# Patient Record
Sex: MALE | Race: OTHER | NOT HISPANIC OR LATINO
[De-identification: names, ages, dates, MRNs, and addresses within clinical notes are randomized per-mention and may not be internally consistent; named-entity substitution may affect disease eponyms.]

---

## 2020-07-10 VITALS
HEART RATE: 70 BPM | SYSTOLIC BLOOD PRESSURE: 147 MMHG | WEIGHT: 194.01 LBS | OXYGEN SATURATION: 95 % | RESPIRATION RATE: 17 BRPM | TEMPERATURE: 98 F | DIASTOLIC BLOOD PRESSURE: 94 MMHG | HEIGHT: 71 IN

## 2020-07-10 DIAGNOSIS — Z01.818 ENCOUNTER FOR OTHER PREPROCEDURAL EXAMINATION: ICD-10-CM

## 2020-07-10 PROBLEM — Z00.00 ENCOUNTER FOR PREVENTIVE HEALTH EXAMINATION: Status: ACTIVE | Noted: 2020-07-10

## 2020-07-10 NOTE — H&P ADULT - HISTORY OF PRESENT ILLNESS
COVID: [address] on 7/11/2020  Pharmacy: [facility] [phone]  Escort: Pt reports being independent and plans to take the train  Cardiologist: Dr. Murillo      Pt is a 65yo undomiciled M w/ and PMHx of HTN, HLD, systolic CHF (EF 35% via Echo on 2/22/2020) and ___CONGENITAL HEART ARRYTHMIA???_____ who presented to his cardiologist, Dr. Murillo for a cardiac clearance to have anesthesia for a possible biopsy ____????____, at which time he underwent a cardiac workup. Patient denies CP, SOB, POST, orthopnea, PND, dizziness, syncope, abdominal pain, diaphoresis, palpitations. Echo (2/22/2020) revealed mild concentric LVH, EF 35%, basal lateral LV wall motion hypokinesis, basal posterior LV wall motion hypokinesis, mid-lateral LV wall motion hypokinesis, normal RV size/fxn, moderately dilated LA, mild MR, mild TR. He was subsequently sent for an NST (6/8/2020) revealing moderate sized partially reversible inferior wall defect, LV motion w/ inferior wall severe hypokinesis, and EF 39%. Patient was subsequently sent for Cardiac Catheterization (7/6/2020) revealing mLAD 70% stenosis, RCA w/ mild diffuse disease, LMCA normal, LCx w/ mild diffuse disease.   In light of patient’s risk factors, abnormal Echo/NST, and known CAD via diagnostic cardiac cath, patient now presents to [facility] for planned PCI of known disease. COVID: [address] on 7/11/2020  Pharmacy: [facility] [phone]  Escort: Pt reports being independent and plans to take the train  Cardiologist: Dr. Murillo      Pt is a 65yo undomiciled M w/ and PMHx of HTN, HLD, systolic CHF (EF 35% via Echo on 2/22/2020) and an "arrhythmia since birth" (no collateral on this from PCP) who presented to his cardiologist, Dr. Murillo for a cardiac clearance to have anesthesia for a colonoscopy.Patient denies CP, SOB, POST, orthopnea, PND, dizziness, syncope, abdominal pain, diaphoresis, palpitations. Echo (2/22/2020) revealed mild concentric LVH, EF 35%, basal lateral LV wall motion hypokinesis, basal posterior LV wall motion hypokinesis, mid-lateral LV wall motion hypokinesis, normal RV size/fxn, moderately dilated LA, mild MR, mild TR. He was subsequently sent for an NST (6/8/2020) revealing moderate sized partially reversible inferior wall defect, LV motion w/ inferior wall severe hypokinesis, and EF 39%. Patient was subsequently sent for Cardiac Catheterization (7/6/2020) revealing mLAD 70% stenosis, RCA w/ mild diffuse disease, LMCA normal, LCx w/ mild diffuse disease.   In light of patient’s risk factors, abnormal Echo/NST, and known CAD via diagnostic cardiac cath, patient now presents to [facility] for planned PCI of known disease. COVID: [address] on 7/11/2020  Pharmacy: [facility] [phone]  Escort: Pt reports being independent and plans to take the train  Cardiologist: Dr. Murillo      Pt is a 65yo undomiciled (lives at shelter in [place]) Male w/ and PMHx of HTN, HLD, systolic CHF (EF 35% via Echo on 2/22/2020) and an "arrhythmia since birth" (no collateral on this from PCP) who presented to his cardiologist, Dr. Murillo for a cardiac clearance to have anesthesia for a colonoscopy.Patient denies CP, SOB, POST, orthopnea, PND, dizziness, syncope, abdominal pain, diaphoresis, palpitations. Echo (2/22/2020) revealed mild concentric LVH, EF 35%, basal lateral LV wall motion hypokinesis, basal posterior LV wall motion hypokinesis, mid-lateral LV wall motion hypokinesis, normal RV size/fxn, moderately dilated LA, mild MR, mild TR. He was subsequently sent for an NST (6/8/2020) revealing moderate sized partially reversible inferior wall defect, LV motion w/ inferior wall severe hypokinesis, and EF 39%. Patient was subsequently sent for Cardiac Catheterization (7/6/2020) revealing mLAD 70% stenosis, RCA w/ mild diffuse disease, LMCA normal, LCx w/ mild diffuse disease.   In light of patient’s risk factors, abnormal Echo/NST, and known CAD via diagnostic cardiac cath, patient now presents to [facility] for planned PCI of known disease. COVID: [address] on 7/11/2020  Pharmacy: [facility] [phone]  Escort: Pt reports being independent and plans to take the train  Cardiologist: Dr. Murillo      Pt is a 65yo undomiciled (lives at shelter in [place]) Male w/ and PMHx of HTN, HLD, Hx of asthma (no current issues; no history of intubation/hospitalization), systolic CHF (EF 35% via Echo on 2/22/2020) and an "arrhythmia since birth" (no collateral on this from PCP) who presented to his cardiologist, Dr. Murillo for a cardiac clearance to have anesthesia for a colonoscopy.Patient denies CP, SOB, POST, orthopnea, PND, dizziness, syncope, abdominal pain, diaphoresis, palpitations. Echo (2/22/2020) revealed mild concentric LVH, EF 35%, basal lateral LV wall motion hypokinesis, basal posterior LV wall motion hypokinesis, mid-lateral LV wall motion hypokinesis, normal RV size/fxn, moderately dilated LA, mild MR, mild TR. He was subsequently sent for an NST (6/8/2020) revealing moderate sized partially reversible inferior wall defect, LV motion w/ inferior wall severe hypokinesis, and EF 39%. Patient was subsequently sent for Cardiac Catheterization (7/6/2020) revealing mLAD 70% stenosis, RCA w/ mild diffuse disease, LMCA normal, LCx w/ mild diffuse disease.   In light of patient’s risk factors, abnormal Echo/NST, and known CAD via diagnostic cardiac cath, patient now presents to [facility] for planned PCI of known disease. COVID: [address] on 7/11/2020  Pharmacy: [facility] [phone]  Escort: Pt reports being independent and plans to take the train  Cardiologist: Dr. Murillo      Pt is a 65yo undomiciled (lives at shelter in [place]) Male current smoker w/ and PMHx of HTN, HLD, Hx of asthma (no current issues; no history of intubation/hospitalization), systolic CHF (EF 35% via Echo on 2/22/2020) and an "arrhythmia since birth" (no collateral on this from PCP) who presented to his cardiologist, Dr. Murillo for a cardiac clearance to have anesthesia for a colonoscopy.Patient denies CP, SOB, POST, orthopnea, PND, dizziness, syncope, abdominal pain, diaphoresis, palpitations. Echo (2/22/2020) revealed mild concentric LVH, EF 35%, basal lateral LV wall motion hypokinesis, basal posterior LV wall motion hypokinesis, mid-lateral LV wall motion hypokinesis, normal RV size/fxn, moderately dilated LA, mild MR, mild TR. He was subsequently sent for an NST (6/8/2020) revealing moderate sized partially reversible inferior wall defect, LV motion w/ inferior wall severe hypokinesis, and EF 39%. Patient was subsequently sent for Cardiac Catheterization (7/6/2020) revealing mLAD 70% stenosis, RCA w/ mild diffuse disease, LMCA normal, LCx w/ mild diffuse disease.   In light of patient’s risk factors, abnormal Echo/NST, and known CAD via diagnostic cardiac cath, patient now presents to [facility] for planned PCI of known disease.

## 2020-07-10 NOTE — H&P ADULT - ASSESSMENT
Pt is a 65yo undomiciled (lives at shelter in [place]) Male current smoker w/ and PMHx of HTN, HLD, Hx of asthma (no current issues; no history of intubation/hospitalization), systolic CHF (EF 35% via Echo on 2/22/2020) and an "arrhythmia since birth" (no collateral on this from PCP) who presents for staged PCI of known lesion     ASA III Mallampati III  Precath consented  Started IVF NS @ 75cc/h   Loaded with ???    Risks & benefits of procedure and alternative therapy have been explained to the patient including but not limited to: allergic reaction, bleeding w/possible need for blood transfusion, infection, renal and vascular compromise, limb damage, arrhythmia, stroke, vessel dissection/perforation, Myocardial infarction, emergent CABG. Informed consent obtained and in chart. Pt is a 65yo undomiciled (lives at shelter in [place]) Male current smoker w/ and PMHx of HTN, HLD, Hx of asthma (no current issues; no history of intubation/hospitalization), systolic CHF (EF 35% via Echo on 2/22/2020) and an "arrhythmia since birth" (no collateral on this from PCP) who presents for staged PCI of known lesion     ASA III Mallampati III  Precath consented  Started IVF NS @ 75cc/h   Loaded with Plavix 600mg POx1 and took daily ASA 81mg POx1 this AM     Risks & benefits of procedure and alternative therapy have been explained to the patient including but not limited to: allergic reaction, bleeding w/possible need for blood transfusion, infection, renal and vascular compromise, limb damage, arrhythmia, stroke, vessel dissection/perforation, Myocardial infarction, emergent CABG. Informed consent obtained and in chart. Pt is a 65yo undomiciled (lives at shelter in [place]) Male current smoker w/ and PMHx of HTN, HLD, Hx of asthma (no current issues; no history of intubation/hospitalization), systolic CHF (EF 35% via Echo on 2/22/2020) and an "arrhythmia since birth" (no collateral on this from PCP) who presents for staged PCI of known lesion     ASA III Mallampati III  Precath consented  Started IVF NS @ 75cc/h   Loaded with Plavix 600mg POx1 and took daily ASA 81mg POx1 this AM   K 3.4 repleted with KCL 40mEq POx1     Risks & benefits of procedure and alternative therapy have been explained to the patient including but not limited to: allergic reaction, bleeding w/possible need for blood transfusion, infection, renal and vascular compromise, limb damage, arrhythmia, stroke, vessel dissection/perforation, Myocardial infarction, emergent CABG. Informed consent obtained and in chart.

## 2020-07-10 NOTE — H&P ADULT - NSHPSOCIALHISTORY_GEN_ALL_CORE
[address] [place] [address]              Dear Samir Lara,    Our records indicate that based on your lung cancer screen performed on 12-8-16 at [facility] your yearly lung screen is due. National guidelines for preventive care encourage patients who smoke, or who have a history of smoking, to receive yearly lung screens if you:    ? are between the ages of 50-69  ? have smoked a pack a day or more for 30 years (or its equivalent such as  one and a half packs a day for 20 years) and   ? continue to smoke or have quit within the past 15 year    To schedule a lung screen you first need to have a discussion with your primary care physician and obtain an order. To schedule the screen call -[facility] ([phone]) at your earliest convenience. If your lung screen was done elsewhere please call to inform me; we value you as a patient and want to ensure that you are getting the appropriate care. If you have any questions or need assistance in scheduling, please dont hesitate to call.      Sincerely,  Omar Lockwood, MSN, BSN, RN, OCN  Lung Navigator  [phone]
Reports occasional EtOH consumption; Reports smoking 2 cigarettes a day; Denies recreational drug use.

## 2020-07-11 ENCOUNTER — APPOINTMENT (OUTPATIENT)
Dept: DISASTER EMERGENCY | Facility: CLINIC | Age: 65
End: 2020-07-11

## 2020-07-12 LAB — SARS-COV-2 N GENE NPH QL NAA+PROBE: NOT DETECTED

## 2020-07-14 ENCOUNTER — TRANSCRIPTION ENCOUNTER (OUTPATIENT)
Age: 65
End: 2020-07-14

## 2020-07-14 ENCOUNTER — INPATIENT (INPATIENT)
Facility: HOSPITAL | Age: 65
LOS: 0 days | Discharge: HOME | DRG: 247 | End: 2020-07-15
Attending: HOSPITALIST | Admitting: INTERNAL MEDICINE
Payer: MEDICARE

## 2020-07-14 LAB
A1C WITH ESTIMATED AVERAGE GLUCOSE RESULT: 5.8 % — HIGH (ref 4–5.6)
ALBUMIN SERPL ELPH-MCNC: 4.8 G/DL — SIGNIFICANT CHANGE UP (ref 3.3–5)
ALP SERPL-CCNC: 77 U/L — SIGNIFICANT CHANGE UP (ref 40–120)
ALT FLD-CCNC: 23 U/L — SIGNIFICANT CHANGE UP (ref 10–45)
ANION GAP SERPL CALC-SCNC: 14 MMOL/L — SIGNIFICANT CHANGE UP (ref 5–17)
APTT BLD: 30 SEC — SIGNIFICANT CHANGE UP (ref 27.5–35.5)
AST SERPL-CCNC: 20 U/L — SIGNIFICANT CHANGE UP (ref 10–40)
BASOPHILS # BLD AUTO: 0.03 K/UL — SIGNIFICANT CHANGE UP (ref 0–0.2)
BASOPHILS NFR BLD AUTO: 0.5 % — SIGNIFICANT CHANGE UP (ref 0–2)
BILIRUB SERPL-MCNC: 0.6 MG/DL — SIGNIFICANT CHANGE UP (ref 0.2–1.2)
BUN SERPL-MCNC: 20 MG/DL — SIGNIFICANT CHANGE UP (ref 7–23)
CALCIUM SERPL-MCNC: 9.4 MG/DL — SIGNIFICANT CHANGE UP (ref 8.4–10.5)
CHLORIDE SERPL-SCNC: 94 MMOL/L — LOW (ref 96–108)
CHOLEST SERPL-MCNC: 179 MG/DL — SIGNIFICANT CHANGE UP (ref 10–199)
CK MB CFR SERPL CALC: 1.3 NG/ML — SIGNIFICANT CHANGE UP (ref 0–6.7)
CK SERPL-CCNC: 135 U/L — SIGNIFICANT CHANGE UP (ref 30–200)
CO2 SERPL-SCNC: 30 MMOL/L — SIGNIFICANT CHANGE UP (ref 22–31)
CREAT SERPL-MCNC: 0.97 MG/DL — SIGNIFICANT CHANGE UP (ref 0.5–1.3)
EOSINOPHIL # BLD AUTO: 0.1 K/UL — SIGNIFICANT CHANGE UP (ref 0–0.5)
EOSINOPHIL NFR BLD AUTO: 1.5 % — SIGNIFICANT CHANGE UP (ref 0–6)
ESTIMATED AVERAGE GLUCOSE: 120 MG/DL — HIGH (ref 68–114)
GLUCOSE SERPL-MCNC: 104 MG/DL — HIGH (ref 70–99)
HCT VFR BLD CALC: 48.1 % — SIGNIFICANT CHANGE UP (ref 39–50)
HDLC SERPL-MCNC: 60 MG/DL — SIGNIFICANT CHANGE UP
HGB BLD-MCNC: 14.8 G/DL — SIGNIFICANT CHANGE UP (ref 13–17)
IMM GRANULOCYTES NFR BLD AUTO: 0.3 % — SIGNIFICANT CHANGE UP (ref 0–1.5)
INR BLD: 1.01 — SIGNIFICANT CHANGE UP (ref 0.88–1.16)
LIPID PNL WITH DIRECT LDL SERPL: 98 MG/DL — SIGNIFICANT CHANGE UP
LYMPHOCYTES # BLD AUTO: 2.34 K/UL — SIGNIFICANT CHANGE UP (ref 1–3.3)
LYMPHOCYTES # BLD AUTO: 35.2 % — SIGNIFICANT CHANGE UP (ref 13–44)
MCHC RBC-ENTMCNC: 22.8 PG — LOW (ref 27–34)
MCHC RBC-ENTMCNC: 30.8 GM/DL — LOW (ref 32–36)
MCV RBC AUTO: 74.1 FL — LOW (ref 80–100)
MONOCYTES # BLD AUTO: 0.5 K/UL — SIGNIFICANT CHANGE UP (ref 0–0.9)
MONOCYTES NFR BLD AUTO: 7.5 % — SIGNIFICANT CHANGE UP (ref 2–14)
NEUTROPHILS # BLD AUTO: 3.66 K/UL — SIGNIFICANT CHANGE UP (ref 1.8–7.4)
NEUTROPHILS NFR BLD AUTO: 55 % — SIGNIFICANT CHANGE UP (ref 43–77)
NRBC # BLD: 0 /100 WBCS — SIGNIFICANT CHANGE UP (ref 0–0)
PLATELET # BLD AUTO: 256 K/UL — SIGNIFICANT CHANGE UP (ref 150–400)
POTASSIUM SERPL-MCNC: 3.4 MMOL/L — LOW (ref 3.5–5.3)
POTASSIUM SERPL-SCNC: 3.4 MMOL/L — LOW (ref 3.5–5.3)
PROT SERPL-MCNC: 8 G/DL — SIGNIFICANT CHANGE UP (ref 6–8.3)
PROTHROM AB SERPL-ACNC: 12.1 SEC — SIGNIFICANT CHANGE UP (ref 10.6–13.6)
RBC # BLD: 6.49 M/UL — HIGH (ref 4.2–5.8)
RBC # FLD: 18 % — HIGH (ref 10.3–14.5)
SODIUM SERPL-SCNC: 138 MMOL/L — SIGNIFICANT CHANGE UP (ref 135–145)
TOTAL CHOLESTEROL/HDL RATIO MEASUREMENT: 3 RATIO — LOW (ref 3.4–9.6)
TRIGL SERPL-MCNC: 104 MG/DL — SIGNIFICANT CHANGE UP (ref 10–149)
TROPONIN T SERPL-MCNC: <0.01 NG/ML — SIGNIFICANT CHANGE UP (ref 0–0.01)
WBC # BLD: 6.65 K/UL — SIGNIFICANT CHANGE UP (ref 3.8–10.5)
WBC # FLD AUTO: 6.65 K/UL — SIGNIFICANT CHANGE UP (ref 3.8–10.5)

## 2020-07-14 PROCEDURE — 99223 1ST HOSP IP/OBS HIGH 75: CPT

## 2020-07-14 PROCEDURE — 93010 ELECTROCARDIOGRAM REPORT: CPT

## 2020-07-14 RX ORDER — CLOPIDOGREL BISULFATE 75 MG/1
600 TABLET, FILM COATED ORAL ONCE
Refills: 0 | Status: COMPLETED | OUTPATIENT
Start: 2020-07-14 | End: 2020-07-14

## 2020-07-14 RX ORDER — CLOPIDOGREL BISULFATE 75 MG/1
75 TABLET, FILM COATED ORAL DAILY
Refills: 0 | Status: DISCONTINUED | OUTPATIENT
Start: 2020-07-15 | End: 2020-07-15

## 2020-07-14 RX ORDER — SACUBITRIL AND VALSARTAN 24; 26 MG/1; MG/1
1 TABLET, FILM COATED ORAL
Refills: 0 | Status: DISCONTINUED | OUTPATIENT
Start: 2020-07-15 | End: 2020-07-15

## 2020-07-14 RX ORDER — ASPIRIN/CALCIUM CARB/MAGNESIUM 324 MG
81 TABLET ORAL DAILY
Refills: 0 | Status: DISCONTINUED | OUTPATIENT
Start: 2020-07-15 | End: 2020-07-15

## 2020-07-14 RX ORDER — CHLORTHALIDONE 50 MG
25 TABLET ORAL EVERY 24 HOURS
Refills: 0 | Status: DISCONTINUED | OUTPATIENT
Start: 2020-07-15 | End: 2020-07-15

## 2020-07-14 RX ORDER — SODIUM CHLORIDE 9 MG/ML
500 INJECTION INTRAMUSCULAR; INTRAVENOUS; SUBCUTANEOUS
Refills: 0 | Status: DISCONTINUED | OUTPATIENT
Start: 2020-07-14 | End: 2020-07-15

## 2020-07-14 RX ORDER — ATORVASTATIN CALCIUM 80 MG/1
80 TABLET, FILM COATED ORAL AT BEDTIME
Refills: 0 | Status: DISCONTINUED | OUTPATIENT
Start: 2020-07-14 | End: 2020-07-15

## 2020-07-14 RX ORDER — SODIUM CHLORIDE 9 MG/ML
500 INJECTION INTRAMUSCULAR; INTRAVENOUS; SUBCUTANEOUS
Refills: 0 | Status: DISCONTINUED | OUTPATIENT
Start: 2020-07-14 | End: 2020-07-14

## 2020-07-14 RX ORDER — POTASSIUM CHLORIDE 20 MEQ
40 PACKET (EA) ORAL ONCE
Refills: 0 | Status: COMPLETED | OUTPATIENT
Start: 2020-07-14 | End: 2020-07-14

## 2020-07-14 RX ORDER — CHLORHEXIDINE GLUCONATE 213 G/1000ML
1 SOLUTION TOPICAL ONCE
Refills: 0 | Status: DISCONTINUED | OUTPATIENT
Start: 2020-07-14 | End: 2020-07-14

## 2020-07-14 RX ORDER — AMLODIPINE BESYLATE 2.5 MG/1
2.5 TABLET ORAL DAILY
Refills: 0 | Status: DISCONTINUED | OUTPATIENT
Start: 2020-07-14 | End: 2020-07-15

## 2020-07-14 RX ORDER — ISOSORBIDE MONONITRATE 60 MG/1
30 TABLET, EXTENDED RELEASE ORAL DAILY
Refills: 0 | Status: DISCONTINUED | OUTPATIENT
Start: 2020-07-15 | End: 2020-07-15

## 2020-07-14 RX ORDER — RANOLAZINE 500 MG/1
1 TABLET, FILM COATED, EXTENDED RELEASE ORAL
Qty: 0 | Refills: 0 | DISCHARGE

## 2020-07-14 RX ORDER — CHLORTHALIDONE 50 MG
1 TABLET ORAL
Qty: 0 | Refills: 0 | DISCHARGE

## 2020-07-14 RX ADMIN — CLOPIDOGREL BISULFATE 600 MILLIGRAM(S): 75 TABLET, FILM COATED ORAL at 17:00

## 2020-07-14 RX ADMIN — Medication 40 MILLIEQUIVALENT(S): at 17:00

## 2020-07-14 RX ADMIN — SODIUM CHLORIDE 40 MILLILITER(S): 9 INJECTION INTRAMUSCULAR; INTRAVENOUS; SUBCUTANEOUS at 17:00

## 2020-07-14 RX ADMIN — ISOSORBIDE MONONITRATE 30 MILLIGRAM(S): 60 TABLET, EXTENDED RELEASE ORAL at 22:19

## 2020-07-14 RX ADMIN — ATORVASTATIN CALCIUM 80 MILLIGRAM(S): 80 TABLET, FILM COATED ORAL at 22:19

## 2020-07-14 RX ADMIN — SODIUM CHLORIDE 50 MILLILITER(S): 9 INJECTION INTRAMUSCULAR; INTRAVENOUS; SUBCUTANEOUS at 22:20

## 2020-07-14 RX ADMIN — SODIUM CHLORIDE 40 MILLILITER(S): 9 INJECTION INTRAMUSCULAR; INTRAVENOUS; SUBCUTANEOUS at 16:41

## 2020-07-14 NOTE — PROVIDER CONTACT NOTE (CHANGE IN STATUS NOTIFICATION) - ASSESSMENT
Hematoma developed. Manual pressure applied. Pleth appropriate. Sat 96 % to right hand. Palpable positive right radial pulse. Capillary refill less than three seconds to fingers of right hand. Patient denies tenderness to right arm. Area hardened and warm to touch. Patient seen by Dr. Euceda and Dr. Wilson at 2115 in cardiac cath holding area. Ultrasound performed that confirmed good flow after additional band applied.

## 2020-07-14 NOTE — PATIENT PROFILE ADULT - PACKS PER DAY
Tissue Cultured Epidermal Autograft Text: The defect edges were debeveled with a #15 scalpel blade.  Given the location of the defect, shape of the defect and the proximity to free margins a tissue cultured epidermal autograft was deemed most appropriate.  The graft was then trimmed to fit the size of the defect.  The graft was then placed in the primary defect and oriented appropriately. 1

## 2020-07-14 NOTE — PROVIDER CONTACT NOTE (CHANGE IN STATUS NOTIFICATION) - ACTION/TREATMENT ORDERED:
Additional TR band placed to right wrist. Patient currently has TR Band x 2 in place. Keep TR Band on for an additional hour.

## 2020-07-15 ENCOUNTER — TRANSCRIPTION ENCOUNTER (OUTPATIENT)
Age: 65
End: 2020-07-15

## 2020-07-15 VITALS — HEART RATE: 78 BPM | DIASTOLIC BLOOD PRESSURE: 89 MMHG | SYSTOLIC BLOOD PRESSURE: 137 MMHG

## 2020-07-15 LAB
ANION GAP SERPL CALC-SCNC: 13 MMOL/L — SIGNIFICANT CHANGE UP (ref 5–17)
BUN SERPL-MCNC: 21 MG/DL — SIGNIFICANT CHANGE UP (ref 7–23)
CALCIUM SERPL-MCNC: 9.1 MG/DL — SIGNIFICANT CHANGE UP (ref 8.4–10.5)
CHLORIDE SERPL-SCNC: 98 MMOL/L — SIGNIFICANT CHANGE UP (ref 96–108)
CO2 SERPL-SCNC: 25 MMOL/L — SIGNIFICANT CHANGE UP (ref 22–31)
CREAT SERPL-MCNC: 0.82 MG/DL — SIGNIFICANT CHANGE UP (ref 0.5–1.3)
GLUCOSE SERPL-MCNC: 106 MG/DL — HIGH (ref 70–99)
HCT VFR BLD CALC: 42.9 % — SIGNIFICANT CHANGE UP (ref 39–50)
HGB BLD-MCNC: 13.6 G/DL — SIGNIFICANT CHANGE UP (ref 13–17)
MAGNESIUM SERPL-MCNC: 2 MG/DL — SIGNIFICANT CHANGE UP (ref 1.6–2.6)
MCHC RBC-ENTMCNC: 22.8 PG — LOW (ref 27–34)
MCHC RBC-ENTMCNC: 31.7 GM/DL — LOW (ref 32–36)
MCV RBC AUTO: 72 FL — LOW (ref 80–100)
NRBC # BLD: 0 /100 WBCS — SIGNIFICANT CHANGE UP (ref 0–0)
PLATELET # BLD AUTO: 237 K/UL — SIGNIFICANT CHANGE UP (ref 150–400)
POTASSIUM SERPL-MCNC: 3.4 MMOL/L — LOW (ref 3.5–5.3)
POTASSIUM SERPL-SCNC: 3.4 MMOL/L — LOW (ref 3.5–5.3)
RBC # BLD: 5.96 M/UL — HIGH (ref 4.2–5.8)
RBC # FLD: 16.7 % — HIGH (ref 10.3–14.5)
SODIUM SERPL-SCNC: 136 MMOL/L — SIGNIFICANT CHANGE UP (ref 135–145)
WBC # BLD: 6.09 K/UL — SIGNIFICANT CHANGE UP (ref 3.8–10.5)
WBC # FLD AUTO: 6.09 K/UL — SIGNIFICANT CHANGE UP (ref 3.8–10.5)

## 2020-07-15 PROCEDURE — 99239 HOSP IP/OBS DSCHRG MGMT >30: CPT

## 2020-07-15 RX ORDER — SACUBITRIL AND VALSARTAN 24; 26 MG/1; MG/1
1 TABLET, FILM COATED ORAL
Qty: 60 | Refills: 3
Start: 2020-07-15 | End: 2020-11-11

## 2020-07-15 RX ORDER — METOPROLOL TARTRATE 50 MG
25 TABLET ORAL DAILY
Refills: 0 | Status: DISCONTINUED | OUTPATIENT
Start: 2020-07-15 | End: 2020-07-15

## 2020-07-15 RX ORDER — CLOPIDOGREL BISULFATE 75 MG/1
1 TABLET, FILM COATED ORAL
Qty: 30 | Refills: 11
Start: 2020-07-15 | End: 2021-07-09

## 2020-07-15 RX ORDER — ISOSORBIDE MONONITRATE 60 MG/1
1 TABLET, EXTENDED RELEASE ORAL
Qty: 0 | Refills: 0 | DISCHARGE

## 2020-07-15 RX ORDER — ISOSORBIDE MONONITRATE 60 MG/1
1 TABLET, EXTENDED RELEASE ORAL
Qty: 30 | Refills: 3
Start: 2020-07-15 | End: 2020-11-11

## 2020-07-15 RX ORDER — METOPROLOL TARTRATE 50 MG
1 TABLET ORAL
Qty: 30 | Refills: 3
Start: 2020-07-15 | End: 2020-11-11

## 2020-07-15 RX ORDER — AMLODIPINE BESYLATE 2.5 MG/1
1 TABLET ORAL
Qty: 0 | Refills: 0 | DISCHARGE

## 2020-07-15 RX ORDER — AMLODIPINE BESYLATE 2.5 MG/1
1 TABLET ORAL
Qty: 30 | Refills: 3
Start: 2020-07-15 | End: 2020-11-11

## 2020-07-15 RX ORDER — POTASSIUM CHLORIDE 20 MEQ
40 PACKET (EA) ORAL ONCE
Refills: 0 | Status: COMPLETED | OUTPATIENT
Start: 2020-07-15 | End: 2020-07-15

## 2020-07-15 RX ORDER — ATORVASTATIN CALCIUM 80 MG/1
1 TABLET, FILM COATED ORAL
Qty: 30 | Refills: 3
Start: 2020-07-15 | End: 2020-11-11

## 2020-07-15 RX ORDER — SACUBITRIL AND VALSARTAN 24; 26 MG/1; MG/1
1 TABLET, FILM COATED ORAL
Qty: 0 | Refills: 0 | DISCHARGE

## 2020-07-15 RX ORDER — ATORVASTATIN CALCIUM 80 MG/1
1 TABLET, FILM COATED ORAL
Qty: 0 | Refills: 0 | DISCHARGE

## 2020-07-15 RX ORDER — ASPIRIN/CALCIUM CARB/MAGNESIUM 324 MG
1 TABLET ORAL
Qty: 30 | Refills: 11
Start: 2020-07-15 | End: 2021-07-09

## 2020-07-15 RX ORDER — ASPIRIN/CALCIUM CARB/MAGNESIUM 324 MG
1 TABLET ORAL
Qty: 0 | Refills: 0 | DISCHARGE

## 2020-07-15 RX ADMIN — AMLODIPINE BESYLATE 2.5 MILLIGRAM(S): 2.5 TABLET ORAL at 05:02

## 2020-07-15 RX ADMIN — CLOPIDOGREL BISULFATE 75 MILLIGRAM(S): 75 TABLET, FILM COATED ORAL at 10:32

## 2020-07-15 RX ADMIN — Medication 40 MILLIEQUIVALENT(S): at 07:50

## 2020-07-15 RX ADMIN — Medication 81 MILLIGRAM(S): at 10:32

## 2020-07-15 RX ADMIN — Medication 25 MILLIGRAM(S): at 10:32

## 2020-07-15 NOTE — DISCHARGE NOTE PROVIDER - CARE PROVIDER_API CALL
Mo Murillo)  Cardiology; Internal Medicine  [address] [place] [address]  Phone: [phone]  Fax: [phone]  Follow Up Time: 1 week Mo Murillo  [address] [place] [address]  Phone: [phone]  Fax: (   )    -  Follow Up Time: 1 week

## 2020-07-15 NOTE — DISCHARGE NOTE PROVIDER - HOSPITAL COURSE
63yo undomiciled (lives at shelter in [place]) Male current smoker w/ and PMHx of HTN, HLD, Hx of asthma (no current issues; no history of intubation/hospitalization), systolic CHF (EF 35% via Echo on 2/22/2020) and an "arrhythmia since birth" (no collateral on this from PCP) who presented to his cardiologist, Dr. Murillo for a cardiac clearance to have anesthesia for a colonoscopy.Patient denies CP, SOB, POST, orthopnea, PND, dizziness, syncope, abdominal pain, diaphoresis, palpitations. Echo (2/22/2020) revealed mild concentric LVH, EF 35%, basal lateral LV wall motion hypokinesis, basal posterior LV wall motion hypokinesis, mid-lateral LV wall motion hypokinesis, normal RV size/fxn, moderately dilated LA, mild MR, mild TR. He was subsequently sent for an NST (6/8/2020) revealing moderate sized partially reversible inferior wall defect, LV motion w/ inferior wall severe hypokinesis, and EF 39%. Patient was subsequently sent for Cardiac Catheterization (7/6/2020) revealing mLAD 70% stenosis, RCA w/ mild diffuse disease, LMCA normal, LCx w/ mild diffuse disease. In light of patient’s risk factors, abnormal Echo/NST, and known CAD via diagnostic cardiac cath, patient now presents to [facility] for planned PCI of known disease.     Pt now s/p cardiac catheterization 7/14/20 receiving NAHID mLAD (70%), access R radial. Pt seen and examined at bedside this AM without any complaints or events overnight, access site stable non TTP, without ecchymosis, bleeding or hematoma, pulse 2+. VSS, labs and telemetry reviewed and pt stable for discharge as discussed with Dr. Aguila. Pt has received appropriate discharge instructions, including medication regimen, access site management and follow up with Dr. Murillo in 1-2 weeks.  Discharge medication regimen has been reviewed with attending with plan for patient to take Aspirin 81 mg daily, Plavix 75 mg daily, and ______. 65yo undomiciled (lives at shelter in [place]) Male current smoker w/ and PMHx of HTN, HLD, Hx of asthma (no current issues; no history of intubation/hospitalization), systolic CHF (EF 35% via Echo on 2/22/2020) and an "arrhythmia since birth" (no collateral on this from PCP) who presented to his cardiologist, Dr. Murillo for a cardiac clearance to have anesthesia for a colonoscopy.Patient denies CP, SOB, POST, orthopnea, PND, dizziness, syncope, abdominal pain, diaphoresis, palpitations. Echo (2/22/2020) revealed mild concentric LVH, EF 35%, basal lateral LV wall motion hypokinesis, basal posterior LV wall motion hypokinesis, mid-lateral LV wall motion hypokinesis, normal RV size/fxn, moderately dilated LA, mild MR, mild TR. He was subsequently sent for an NST (6/8/2020) revealing moderate sized partially reversible inferior wall defect, LV motion w/ inferior wall severe hypokinesis, and EF 39%. Patient was subsequently sent for Cardiac Catheterization (7/6/2020) revealing mLAD 70% stenosis, RCA w/ mild diffuse disease, LMCA normal, LCx w/ mild diffuse disease. In light of patient’s risk factors, abnormal Echo/NST, and known CAD via diagnostic cardiac cath, patient now presents to [facility] for planned PCI of known disease.     Pt now s/p cardiac catheterization 7/14/20 receiving NAHID mLAD (70%), access R radial. Pt seen and examined at bedside this AM without any complaints or events overnight, access site stable non TTP, without ecchymosis, bleeding or hematoma, pulse 2+. VSS, labs and telemetry reviewed and pt stable for discharge as discussed with Dr. Aguila. Pt has received appropriate discharge instructions, including medication regimen, access site management and follow up with Dr. Murillo in 1-2 weeks.  Discharge medication regimen has been reviewed with attending with plan for patient to take Aspirin 81 mg daily, Plavix 75 mg daily, atorvastatin 80mg QD, and Toprol 25mg QD. 63yo undomiciled (lives at shelter in [place]) Male current smoker w/ and PMHx of HTN, HLD, Hx of asthma (no current issues; no history of intubation/hospitalization), systolic CHF (EF 35% via Echo on 2/22/2020) and an "arrhythmia since birth" (no collateral on this from PCP) who presented to his cardiologist, Dr. Murillo for a cardiac clearance to have anesthesia for a colonoscopy.Patient denies CP, SOB, POST, orthopnea, PND, dizziness, syncope, abdominal pain, diaphoresis, palpitations. Echo (2/22/2020) revealed mild concentric LVH, EF 35%, basal lateral LV wall motion hypokinesis, basal posterior LV wall motion hypokinesis, mid-lateral LV wall motion hypokinesis, normal RV size/fxn, moderately dilated LA, mild MR, mild TR. He was subsequently sent for an NST (6/8/2020) revealing moderate sized partially reversible inferior wall defect, LV motion w/ inferior wall severe hypokinesis, and EF 39%. Patient was subsequently sent for Cardiac Catheterization (7/6/2020) revealing mLAD 70% stenosis, RCA w/ mild diffuse disease, LMCA normal, LCx w/ mild diffuse disease. In light of patient’s risk factors, abnormal Echo/NST, and known CAD via diagnostic cardiac cath, patient now presents to [facility] for planned PCI of known disease.     Pt now s/p cardiac catheterization 7/14/20 receiving NAHID mLAD (70%), access R radial. Pt seen and examined at bedside this AM without any complaints or events overnight, access site stable non TTP, patient noted to have proximal swelling and hyperpigmentation of skin, IC fellow and Dr. Angulo evaluated site and stated site stable and okay for discharge, pulses 2+. VSS, labs and telemetry reviewed and pt stable for discharge as discussed with Dr. Aguila. Pt has received appropriate discharge instructions, including medication regimen, access site management and follow up with Dr. Murillo in 1-2 weeks.  Discharge medication regimen has been reviewed with attending with plan for patient to take Aspirin 81 mg daily, Plavix 75 mg daily, atorvastatin 80mg QD, and Toprol 25mg QD.

## 2020-07-15 NOTE — DISCHARGE NOTE NURSING/CASE MANAGEMENT/SOCIAL WORK - PATIENT PORTAL LINK FT
You can access the FollowMyHealth Patient Portal offered by [facility] by registering at the following website: [email or web address]. By joining [facility]’s FollowMyHealth portal, you will also be able to view your health information using other applications (apps) compatible with our system.

## 2020-07-15 NOTE — DISCHARGE NOTE PROVIDER - PROVIDER TOKENS
PROVIDER:[TOKEN:[[id number]],FOLLOWUP:[1 week]] FREE:[LAST:[Asti],FIRST:[Mo],PHONE:[phone]],FAX:[(   [phone]    -],ADDRESS:[[address] [place] [address]],FOLLOWUP:[1 week]]

## 2020-07-15 NOTE — PROVIDER CONTACT NOTE (OTHER) - ASSESSMENT
Pt's HR 90, /78, sp02 98% on room air. Pt asymptomatic, denies palpitations/chest pain/shortness of breath. Pt states he's had an "irregular heart rate all my life"

## 2020-07-15 NOTE — DISCHARGE NOTE PROVIDER - NSDCMRMEDTOKEN_GEN_ALL_CORE_FT
amLODIPine 2.5 mg oral tablet: 1 tab(s) orally once a day  Aspirin Enteric Coated 81 mg oral delayed release tablet: 1 tab(s) orally once a day  atorvastatin 40 mg oral tablet: 1 tab(s) orally once a day  chlorthalidone 25 mg oral tablet: 1 tab(s) orally once a day  Entresto 24 mg-26 mg oral tablet: 1 tab(s) orally 2 times a day  isosorbide mononitrate 30 mg oral tablet, extended release: 1 tab(s) orally once a day (in the morning) amLODIPine 2.5 mg oral tablet: 1 tab(s) orally once a day  Aspirin Enteric Coated 81 mg oral delayed release tablet: 1 tab(s) orally once a day  atorvastatin 80 mg oral tablet: 1 tab(s) orally once a day (at bedtime)  chlorthalidone 25 mg oral tablet: 1 tab(s) orally once a day  clopidogrel 75 mg oral tablet: 1 tab(s) orally once a day  Entresto 24 mg-26 mg oral tablet: 1 tab(s) orally 2 times a day  isosorbide mononitrate 30 mg oral tablet, extended release: 1 tab(s) orally once a day (in the morning)  Toprol-XL 25 mg oral tablet, extended release: 1 tab(s) orally once a day

## 2020-07-15 NOTE — DISCHARGE NOTE PROVIDER - NSDCCPCAREPLAN_GEN_ALL_CORE_FT
PRINCIPAL DISCHARGE DIAGNOSIS  Diagnosis: CAD (coronary artery disease)  Assessment and Plan of Treatment: You were found to have blockages in the arteries of your heart, also known as Coronary Artery Diseease. You underwent a cardiac angiogram on 7/14/20 and received a stent to the left anterior descending artery. PLEASE CONTINUE ASPIRIN 81MG DAILY AND PLAVIX 75MG DAILY. DO NOT STOP THESE MEDICATIONS FOR ANY REASON AS THEY ARE KEEPING YOUR STENT OPEN AND PREVENTING A HEART ATTACK.   Avoid strenuous activity or heavy lifting anything more than 5lbs for the next five days. Do not take a bath or swim for the next five days; you may shower. For any bleeding or hematoma formation (hardened blood collection under the skin) at the access site of your right wrist please hold pressure and go to the emergency room. Please follow up with Dr. Murillo in 1-2 weeks. For recurrent chest pain, please call your doctor or go to the emergency room.      SECONDARY DISCHARGE DIAGNOSES  Diagnosis: HTN (hypertension)  Assessment and Plan of Treatment: Please continue your medications as listed to keep your blood pressure controlled. For blood pressure that is too high or too low please see your doctor or go to the emergency room as necessary.    Diagnosis: Congestive heart failure (CHF)  Assessment and Plan of Treatment: You have a weak heart, also known as Congestive Heart Failure (CHF). Your Ejection Fraction (EF) is ____, a normal EF is 55-60%. Please continue _____ exactly as listed. Avoid drinking more than 1.5L of fluid daily. Maintain a low salt diet and weigh yourself daily. For any significant increases in daily weight with associated swelling in the legs or abdomen with shortness of breath, please call your doctor or go to the emergency room. Follow up with Dr. Murillo in 1 week.    Diagnosis: HLD (hyperlipidemia)  Assessment and Plan of Treatment: Please continue ____ at bedtime to keep your cholesterol low. High cholesterol contributes to heart disease. PRINCIPAL DISCHARGE DIAGNOSIS  Diagnosis: CAD (coronary artery disease)  Assessment and Plan of Treatment: You were found to have blockages in the arteries of your heart, also known as Coronary Artery Diseease. You underwent a cardiac angiogram on 7/14/20 and received a stent to the left anterior descending artery. PLEASE CONTINUE ASPIRIN 81MG DAILY AND PLAVIX 75MG DAILY. DO NOT STOP THESE MEDICATIONS FOR ANY REASON AS THEY ARE KEEPING YOUR STENT OPEN AND PREVENTING A HEART ATTACK.   Avoid strenuous activity or heavy lifting anything more than 5lbs for the next five days. Do not take a bath or swim for the next five days; you may shower. For any bleeding or hematoma formation (hardened blood collection under the skin) at the access site of your right wrist please hold pressure and go to the emergency room. Please follow up with Dr. Murillo in 1-2 weeks. For recurrent chest pain, please call your doctor or go   to the emergency room.  -You were started on metoprolol succinate 25mg orally daily      SECONDARY DISCHARGE DIAGNOSES  Diagnosis: HTN (hypertension)  Assessment and Plan of Treatment: Please continue your medications as listed to keep your blood pressure controlled. For blood pressure that is too high or too low please see your doctor or go to the emergency room as necessary.    Diagnosis: Congestive heart failure (CHF)  Assessment and Plan of Treatment: You have a weak heart, also known as Congestive Heart Failure (CHF). Your Ejection Fraction (EF) is 35 % a normal EF is 55-60%. Please continue Entresto exactly as listed. Avoid drinking more than 1.5L of fluid daily. Maintain a low salt diet and weigh yourself daily. For any significant increases in daily weight with associated swelling in the legs or abdomen with shortness of breath, please call your doctor or go to the emergency room. Follow up with Dr. Murillo in 1 week.    Diagnosis: HLD (hyperlipidemia)  Assessment and Plan of Treatment: Please continue atorvastatin 80mg at bedtime to keep your cholesterol low. High cholesterol contributes to heart disease.

## 2020-07-16 PROCEDURE — 82550 ASSAY OF CK (CPK): CPT

## 2020-07-16 PROCEDURE — C1874: CPT

## 2020-07-16 PROCEDURE — 80053 COMPREHEN METABOLIC PANEL: CPT

## 2020-07-16 PROCEDURE — C1769: CPT

## 2020-07-16 PROCEDURE — 36415 COLL VENOUS BLD VENIPUNCTURE: CPT

## 2020-07-16 PROCEDURE — 85027 COMPLETE CBC AUTOMATED: CPT

## 2020-07-16 PROCEDURE — C1725: CPT

## 2020-07-16 PROCEDURE — 84484 ASSAY OF TROPONIN QUANT: CPT

## 2020-07-16 PROCEDURE — 83735 ASSAY OF MAGNESIUM: CPT

## 2020-07-16 PROCEDURE — 85730 THROMBOPLASTIN TIME PARTIAL: CPT

## 2020-07-16 PROCEDURE — 80061 LIPID PANEL: CPT

## 2020-07-16 PROCEDURE — 85610 PROTHROMBIN TIME: CPT

## 2020-07-16 PROCEDURE — 82553 CREATINE MB FRACTION: CPT

## 2020-07-16 PROCEDURE — 80048 BASIC METABOLIC PNL TOTAL CA: CPT

## 2020-07-16 PROCEDURE — 85025 COMPLETE CBC W/AUTO DIFF WBC: CPT

## 2020-07-16 PROCEDURE — 93005 ELECTROCARDIOGRAM TRACING: CPT

## 2020-07-16 PROCEDURE — C1894: CPT

## 2020-07-16 PROCEDURE — C1887: CPT

## 2020-07-16 PROCEDURE — 83036 HEMOGLOBIN GLYCOSYLATED A1C: CPT

## 2020-07-22 DIAGNOSIS — Z79.82 LONG TERM (CURRENT) USE OF ASPIRIN: ICD-10-CM

## 2020-07-22 DIAGNOSIS — E78.5 HYPERLIPIDEMIA, UNSPECIFIED: ICD-10-CM

## 2020-07-22 DIAGNOSIS — Z59.0 HOMELESSNESS: ICD-10-CM

## 2020-07-22 DIAGNOSIS — J45.909 UNSPECIFIED ASTHMA, UNCOMPLICATED: ICD-10-CM

## 2020-07-22 DIAGNOSIS — F17.210 NICOTINE DEPENDENCE, CIGARETTES, UNCOMPLICATED: ICD-10-CM

## 2020-07-22 DIAGNOSIS — I50.20 UNSPECIFIED SYSTOLIC (CONGESTIVE) HEART FAILURE: ICD-10-CM

## 2020-07-22 DIAGNOSIS — I25.10 ATHEROSCLEROTIC HEART DISEASE OF NATIVE CORONARY ARTERY WITHOUT ANGINA PECTORIS: ICD-10-CM

## 2020-07-22 DIAGNOSIS — I25.119 ATHEROSCLEROTIC HEART DISEASE OF NATIVE CORONARY ARTERY WITH UNSPECIFIED ANGINA PECTORIS: ICD-10-CM

## 2020-07-22 DIAGNOSIS — I11.0 HYPERTENSIVE HEART DISEASE WITH HEART FAILURE: ICD-10-CM

## 2020-07-22 SDOH — ECONOMIC STABILITY - HOUSING INSECURITY: HOMELESSNESS: Z59.0

## 2020-11-17 PROBLEM — E78.5 HYPERLIPIDEMIA, UNSPECIFIED: Chronic | Status: ACTIVE | Noted: 2020-07-10

## 2020-11-17 PROBLEM — J45.909 UNSPECIFIED ASTHMA, UNCOMPLICATED: Chronic | Status: ACTIVE | Noted: 2020-07-10

## 2020-11-17 PROBLEM — I50.20 UNSPECIFIED SYSTOLIC (CONGESTIVE) HEART FAILURE: Chronic | Status: ACTIVE | Noted: 2020-07-10

## 2020-11-17 PROBLEM — I10 ESSENTIAL (PRIMARY) HYPERTENSION: Chronic | Status: ACTIVE | Noted: 2020-07-10

## 2020-12-21 ENCOUNTER — APPOINTMENT (OUTPATIENT)
Dept: NEUROLOGY | Facility: CLINIC | Age: 65
End: 2020-12-21
Payer: MEDICARE

## 2020-12-21 VITALS
HEIGHT: 71 IN | OXYGEN SATURATION: 97 % | BODY MASS INDEX: 27.58 KG/M2 | SYSTOLIC BLOOD PRESSURE: 137 MMHG | WEIGHT: 197 LBS | TEMPERATURE: 97.3 F | HEART RATE: 64 BPM | RESPIRATION RATE: 16 BRPM | DIASTOLIC BLOOD PRESSURE: 66 MMHG

## 2020-12-21 DIAGNOSIS — Z86.79 PERSONAL HISTORY OF OTHER DISEASES OF THE CIRCULATORY SYSTEM: ICD-10-CM

## 2020-12-21 DIAGNOSIS — F17.200 NICOTINE DEPENDENCE, UNSPECIFIED, UNCOMPLICATED: ICD-10-CM

## 2020-12-21 DIAGNOSIS — Z86.39 PERSONAL HISTORY OF OTHER ENDOCRINE, NUTRITIONAL AND METABOLIC DISEASE: ICD-10-CM

## 2020-12-21 PROCEDURE — 99072 ADDL SUPL MATRL&STAF TM PHE: CPT

## 2020-12-21 PROCEDURE — 99204 OFFICE O/P NEW MOD 45 MIN: CPT

## 2020-12-21 NOTE — PHYSICAL EXAM
[FreeTextEntry1] : Gen: appears well, well-nourished, no acute distress\par \par MS: awake, alert, oriented, speech fluent, comprehension intact, good fund of knowledge, recent and remote memory intact, attention intact\par \par CN: PERRL, EOMI, visual fields full, facial strength and sensation intact and symmetric, hearing grossly intact, palate elevation symmetric, tongue midline, no tongue atrophy or fasciculations, shoulder shrug intact and symmetric\par \par Motor: normal bulk and tone, 5/5 strength throughout, no abnormal movements\par \par Sensory: vibration mild-moderately reduced at toes b/l, otherwise intact\par \par Reflexes: left brachioradialis and biceps 1+, otherwise UE 3+, patellar 3+ b/l, absent achilles b/l \par \par Coordination: no dysmetria on finger to nose, Romberg negative\par \par Gait: normal, can tandem without difficulty\par \par CV: 2+ pulses b/l, no edema\par \par Ophtho: fundi not visualized\par

## 2020-12-21 NOTE — CONSULT LETTER
[Dear  ___] : Dear  [unfilled], [Consult Letter:] : I had the pleasure of evaluating your patient, [unfilled]. [Please see my note below.] : Please see my note below. [Consult Closing:] : Thank you very much for allowing me to participate in the care of this patient.  If you have any questions, please do not hesitate to contact me. [Sincerely,] : Sincerely, [FreeTextEntry3] : Yuniel Webb M.D.\par Neurology, Electromyography and Neuromuscular Medicine\par [facility]\par \par  of Neurology\par [facility] [employer]

## 2020-12-21 NOTE — ASSESSMENT
[FreeTextEntry1] : Intermittent nature of symptoms is not consistent with polyneuropathy\par He may be having intermittent compression of sciatic nerves in the thigh from prolonged sitting\par However given hyperreflexia need to r/o cord compression \par MRI C and T spine ordered\par If unremarkable return for NCS/EMG

## 2020-12-21 NOTE — HISTORY OF PRESENT ILLNESS
[FreeTextEntry1] : CC: leg numbness\par \par HPI: 65 year old man referred by Dr. Oscar Casey for leg numbness\par \par Location: feet and legs up to knees\par Quality: numbness, tingling \par Severity: severe\par Duration: 15 minutes at a time\par Timing: intermittent, 4 episodes since July 2020\par Context: no injury or trauma \par Modifying Factors: worse when sitting for a while \par Associated signs and symptoms: weakness in legs during these episodes\par \par Data reviewed:\par Labs: COVID PCR negative\par Prior records: d/c summary from July 2020; note from Dr. Casey\par \par \par ROS: 13 pt review of systems performed and reviewed with patient (General, Eyes, Ears, Cardiovascular, Respiratory, Gastrointestinal, Genitourinary, Musculoskeletal, Skin, Endocrine, Hematologic, Psychiatric, Neurologic)\par Past medical history, surgical history, social history, and family history reviewed with patient\par See scanned document for details

## 2021-01-10 ENCOUNTER — APPOINTMENT (OUTPATIENT)
Dept: MRI IMAGING | Facility: CLINIC | Age: 66
End: 2021-01-10

## 2021-03-01 ENCOUNTER — APPOINTMENT (OUTPATIENT)
Dept: NEUROLOGY | Facility: CLINIC | Age: 66
End: 2021-03-01
Payer: MEDICARE

## 2021-03-01 VITALS
TEMPERATURE: 97.1 F | BODY MASS INDEX: 28.14 KG/M2 | DIASTOLIC BLOOD PRESSURE: 75 MMHG | HEIGHT: 71 IN | RESPIRATION RATE: 16 BRPM | WEIGHT: 201 LBS | HEART RATE: 60 BPM | SYSTOLIC BLOOD PRESSURE: 142 MMHG | OXYGEN SATURATION: 96 %

## 2021-03-01 DIAGNOSIS — R20.0 ANESTHESIA OF SKIN: ICD-10-CM

## 2021-03-01 DIAGNOSIS — R29.2 ABNORMAL REFLEX: ICD-10-CM

## 2021-03-01 PROCEDURE — 99072 ADDL SUPL MATRL&STAF TM PHE: CPT

## 2021-03-01 PROCEDURE — 99212 OFFICE O/P EST SF 10 MIN: CPT

## 2021-03-01 NOTE — PHYSICAL EXAM
[FreeTextEntry1] : Gen: appears well, well-nourished, no acute distress\par \par MS: awake, alert, oriented, speech fluent, comprehension intact, good fund of knowledge, recent and remote memory intact, attention intact\par \par Motor: normal bulk and tone, 5/5 strength throughout, no abnormal movements\par \par Sensory: vibration mild-moderately reduced at toes b/l, otherwise intact\par \par Reflexes: left brachioradialis and biceps 1+, otherwise UE 3+, patellar 3+ b/l, absent achilles b/l \par \par Coordination: no dysmetria on finger to nose, Romberg negative\par \par Gait: normal, can tandem with mild difficulty\par

## 2021-03-01 NOTE — CONSULT LETTER
[Dear  ___] : Dear  [unfilled], [Courtesy Letter:] : I had the pleasure of seeing your patient, [unfilled], in my office today. [Please see my note below.] : Please see my note below. [Consult Closing:] : Thank you very much for allowing me to participate in the care of this patient.  If you have any questions, please do not hesitate to contact me. [Sincerely,] : Sincerely, [FreeTextEntry3] : Yuniel Webb M.D.\par Neurology, Electromyography and Neuromuscular Medicine\par [facility]\par \par  of Neurology\par [facility] [employer] [facility]

## 2021-03-01 NOTE — HISTORY OF PRESENT ILLNESS
[FreeTextEntry1] : Episodes of leg numbness have been less frequent since last visit - only occurred once, about 2 weeks ago\par He was lying in bed when his legs went numb for a few minutes, then came back\par Otherwise he feels well\par He had an appointment for MRI of the C and T spine but went to the wrong place and did not have it done

## 2021-03-01 NOTE — ASSESSMENT
[FreeTextEntry1] : Encouraged to reschedule MRI C and T spine\par If negative return for NCS/EMG, although symptoms have improved significantly

## 2021-03-31 ENCOUNTER — OUTPATIENT (OUTPATIENT)
Dept: OUTPATIENT SERVICES | Facility: HOSPITAL | Age: 66
LOS: 1 days | End: 2021-03-31

## 2021-03-31 ENCOUNTER — APPOINTMENT (OUTPATIENT)
Dept: MRI IMAGING | Facility: CLINIC | Age: 66
End: 2021-03-31
Payer: MEDICARE

## 2021-03-31 ENCOUNTER — RESULT REVIEW (OUTPATIENT)
Age: 66
End: 2021-03-31

## 2021-03-31 PROCEDURE — 72141 MRI NECK SPINE W/O DYE: CPT | Mod: 26,MH

## 2021-03-31 PROCEDURE — 72146 MRI CHEST SPINE W/O DYE: CPT | Mod: 26,MH

## 2021-04-02 ENCOUNTER — NON-APPOINTMENT (OUTPATIENT)
Age: 66
End: 2021-04-02